# Patient Record
Sex: MALE | Race: WHITE
[De-identification: names, ages, dates, MRNs, and addresses within clinical notes are randomized per-mention and may not be internally consistent; named-entity substitution may affect disease eponyms.]

---

## 2018-03-14 ENCOUNTER — HOSPITAL ENCOUNTER (OUTPATIENT)
Dept: HOSPITAL 17 - HDIC | Age: 48
Discharge: HOME | End: 2018-03-14
Attending: INTERNAL MEDICINE
Payer: COMMERCIAL

## 2018-03-14 VITALS — WEIGHT: 215.83 LBS | BODY MASS INDEX: 31.97 KG/M2 | HEIGHT: 69 IN

## 2018-03-14 VITALS
HEART RATE: 61 BPM | DIASTOLIC BLOOD PRESSURE: 81 MMHG | SYSTOLIC BLOOD PRESSURE: 127 MMHG | OXYGEN SATURATION: 97 % | RESPIRATION RATE: 16 BRPM | TEMPERATURE: 98.4 F

## 2018-03-14 DIAGNOSIS — I25.10: Primary | ICD-10-CM

## 2018-03-14 DIAGNOSIS — Z79.899: ICD-10-CM

## 2018-03-14 LAB
BASOPHILS # BLD AUTO: 0 TH/MM3 (ref 0–0.2)
BASOPHILS NFR BLD: 0.7 % (ref 0–2)
BUN SERPL-MCNC: 16 MG/DL (ref 7–18)
CALCIUM SERPL-MCNC: 8.6 MG/DL (ref 8.5–10.1)
CHLORIDE SERPL-SCNC: 107 MEQ/L (ref 98–107)
CREAT SERPL-MCNC: 0.95 MG/DL (ref 0.6–1.3)
EOSINOPHIL # BLD: 0.1 TH/MM3 (ref 0–0.4)
EOSINOPHIL NFR BLD: 1.6 % (ref 0–4)
ERYTHROCYTE [DISTWIDTH] IN BLOOD BY AUTOMATED COUNT: 13 % (ref 11.6–17.2)
GFR SERPLBLD BASED ON 1.73 SQ M-ARVRAT: 84 ML/MIN (ref 89–?)
GLUCOSE SERPL-MCNC: 96 MG/DL (ref 74–106)
HCO3 BLD-SCNC: 24.5 MEQ/L (ref 21–32)
HCT VFR BLD CALC: 43 % (ref 39–51)
HGB BLD-MCNC: 15.1 GM/DL (ref 13–17)
INR PPP: 1 RATIO
LYMPHOCYTES # BLD AUTO: 1.6 TH/MM3 (ref 1–4.8)
LYMPHOCYTES NFR BLD AUTO: 23.7 % (ref 9–44)
MCH RBC QN AUTO: 32.1 PG (ref 27–34)
MCHC RBC AUTO-ENTMCNC: 35.1 % (ref 32–36)
MCV RBC AUTO: 91.4 FL (ref 80–100)
MONOCYTE #: 0.5 TH/MM3 (ref 0–0.9)
MONOCYTES NFR BLD: 7.4 % (ref 0–8)
NEUTROPHILS # BLD AUTO: 4.4 TH/MM3 (ref 1.8–7.7)
NEUTROPHILS NFR BLD AUTO: 66.6 % (ref 16–70)
PLATELET # BLD: 327 TH/MM3 (ref 150–450)
PMV BLD AUTO: 7.4 FL (ref 7–11)
PROTHROMBIN TIME: 10.3 SEC (ref 9.8–11.6)
RBC # BLD AUTO: 4.71 MIL/MM3 (ref 4.5–5.9)
SODIUM SERPL-SCNC: 141 MEQ/L (ref 136–145)
WBC # BLD AUTO: 6.6 TH/MM3 (ref 4–11)

## 2018-03-14 PROCEDURE — C1887 CATHETER, GUIDING: HCPCS

## 2018-03-14 PROCEDURE — 93005 ELECTROCARDIOGRAM TRACING: CPT

## 2018-03-14 PROCEDURE — 93458 L HRT ARTERY/VENTRICLE ANGIO: CPT

## 2018-03-14 PROCEDURE — 85610 PROTHROMBIN TIME: CPT

## 2018-03-14 PROCEDURE — 93571 IV DOP VEL&/PRESS C FLO 1ST: CPT

## 2018-03-14 PROCEDURE — 85025 COMPLETE CBC W/AUTO DIFF WBC: CPT

## 2018-03-14 PROCEDURE — 85730 THROMBOPLASTIN TIME PARTIAL: CPT

## 2018-03-14 PROCEDURE — 80048 BASIC METABOLIC PNL TOTAL CA: CPT

## 2018-03-14 PROCEDURE — 99153 MOD SED SAME PHYS/QHP EA: CPT

## 2018-03-14 PROCEDURE — C1769 GUIDE WIRE: HCPCS

## 2018-03-14 PROCEDURE — 99152 MOD SED SAME PHYS/QHP 5/>YRS: CPT

## 2018-03-14 PROCEDURE — C1893 INTRO/SHEATH, FIXED,NON-PEEL: HCPCS

## 2018-03-14 NOTE — CATHPROC
[facility] Report

Study Information

Study Number    Admission           Scheduled Start             Study Start

 

[id number]    Mar 14 2018 11:51AM      03/14/2018                Mar 14 2018 1:11PM

 

[facility]

 

Cardiac Catheterization

 

Admit Source                Facility Department

 

Other                   [facility] - Cath Lab

 

Physician and Clinical Staff

Initial Howard Villar         Circulator     Aleksandra Sahu,RN

 

                          Recorder      Florencia Aguero,RT(R)

 

                          Recorder      Siddhartha DUPONT, Lalita Paz,RT(R)

 

                          X-Ray        Felipa Tam ,RT(R)

 

Procedures Performed

Procedure                     Location (Site)            Vessel Name

 

Coronary Angiograms                 LCA                 Left Coronary

Coronary Angiograms                 RCA                 Right Coronary

L Heart Cath

Wire insertion                   Radial (left)             Radial Art.

Equipment

Time           Description           Size         Mfg Part Number  Used/Scraped

                   TRANSDUCER, TRUWAVE                  [id number]

13:14    [employer]                      *                    Used

                   W/Dunwello                      *[id number]

                                              [id number]



                                              *[id number]

                                              [id number]

13:14    [employer]    PACK, CCL CUSTOM        *                    Used

                                              *[id number]

13:14    [employer]    SUPPORT, ARTERIAL ADULT                [id number] *[id number] Used

                                              [id number]

13:50    [employer]         JL 4.0 DXTERITY CATHETER    FR 5                  Used

                                              *[id number]

                   PIG  DXTERITY

14:16    [employer]                         FR 5         [id number]    Used

                   CATHETER

                                              [id number]

13:58    [employer]/AVE       EBU 3.5 Z2 GUIDE CATHETER    FR 6                  Used

                                              *[id number]

                   BAND, RADIAL COMPRESSION TR              [id number]

14:22    [employer]                       24CM                  Used

                   SHORT 24                       *[id number]

13:58    [employer]       PACK, ANGIOPLASTY        *           [id number]      Used

                                              [id number]

13:14    [employer]       WIRE, EXCHANGE 260CM 3MMJ    260CM                  Used

                                              *[id number]

                                              [id number]

13:14    [employer]           MANIFOLD, 4 PORT        *                    Used

                                              *[id number]

13:14    [employer]          OMNIPAQUE, 350 MG, 150ML    150ML         [id number]      Used

                                              [id number]

13:14    [employer]       BLANKET,WARM AIR CCL      *                    Used

                                              *[id number]

                   SHEATH, FR6 TRANSRADIAL                [id number]

13:14    [employer]                      FR 6                  Used

                   SLENDER 10CM                     *[id number]

13:58    [employer]          PRIME WIRE, VERRATA 185CM    185CM         [id number] *[id number] Used

 

Equipment Model, Serial, Lot Number and Expiration Data

Description              Model Number      Serial Number      Lot Number        Expiration Date

 

JL 4.0 DXTERITY CATHETER                               [id number]         09-

 

PACK, ANGIOPLASTY                                  [id number]         09-

 

PIG  DXTERITY CATHETER                            [id number]         08-

 

PRIME WIRE, JOSHRATA 185CM                  [id number]                 01-

 

 

History: Allergies

Allergy              Reaction

 

Ibuprofen

 

 

History: Risk Factors

                     Family History of

Hypertension    Dyslipidemia               Previous MI   Previous Heart Failure

                     Premature CAD

Yes         Yes         Yes         No        No

Prior Valve

          Prior PCI      Prior CABG

Surgery

No         No          No

          Cerebrovascular   Peripheral Artery  Chronic Lung

On Dialysis                                  Diabetes

          Disease       Disease       Disease

No         No          No         No        No

History: Stress Tests

Stress or Imaging Studies Performed

 

No

 

 

History: Other

Current Smoker

 

No

 

Labs

Hgb (g/dl)     Hct (%)        WBC (l/cumm)      Platelets (thousands)

 

11.60-17.00     35.00-51.00      4.00-11.00       150..00

 

15.1        43           6.6          327

 

Glucose (mg/dl)   BUN (mg/dl)      Creatinine (mg/dl)  BUN:Creatinine (1:x)

74..00    7.00-18.00       0.50-1.30      10.00-20.00

 

96         16           0.9         17.8

 

Na (meq/l)     K (meq/l)

 

136..00    3.50-5.10

 

141         3.6

 

INR (PTT:PT)

 

0.90-1.10

 

1

 

CPK-MB (ng/ML)

 

0.50-3.60

 

Not Drawn

 

 

 

 

Medication

Medication Total Dose (Bolus/Oral)

Medication            Total Dosage/Unit

 

1% XYLOCAINE              3 mL

 

FENTANYL               25 mcg

 

HEPARIN              5800 units

 

RADIAL COCKTAIL            5 mL (Bolus)

 

VERSED                 1 mg

Medications (Bolus/Oral)

Medication           Time Given          Dosage/Unit      Administered By      Reason

 

VERSED             3/14/2018 1:36:18 PM     0.5 mg        Aleksandra Sahu

0.5 mg VERSED given by Aleksandra Sahu, RN via Peripheral IV. Ordered by Howard Mata

 

FENTANYL            3/14/2018 1:36:21 PM     25 mcg        Aleksandra Sahu

25 mcg FENTANYL given in lab by Aleksandra Sahu, RN via Peripheral IV. Ordered by Howard Mata
.

 

1% XYLOCAINE          3/14/2018 1:37:02 PM      3 mL        Howard Mata

3 mL 1% XYLOCAINE given in lab by Howard Mata in Left Wrist via Subcutaneous. Ordered by Howard Arnold

 

                                                      Ntg 200mcg Verapamil 2.5mg Heparin

RADIAL COCKTAIL        3/14/2018 1:38:50 PM      5 mL (Bolus)    Howard Mata

                                                      3000U

5 mL (Bolus) RADIAL COCKTAIL given in lab by Howard Mata via Radial. Using [Solution Name]. O
rdered by Howard Mata Reason:

Ntg 200mcg Verapamil 2.5mg Heparin 4000U.

VERSED             3/14/2018 1:40:54 PM     0.5 mg        Aleksandra Sahu

0.5 mg VERSED given by Aleksandra Sahu, RN via Peripheral IV. Ordered by Howard Mata

 

HEPARIN            3/14/2018 1:57:49 PM    5800 units       Aleksandra Sahu

5800 units HEPARIN given in lab by Aleksandra Sahu, RN via Peripheral IV. Ordered by Blane Mata

 

 

Initial Case Assessment

Initial Case Assessment

Cardiovascular

HR             NIBP

 

84             134/84

 

Edema Present       Skin color              Skin

 

None            Normal                Warm Dry

 

Circulatory - Right Pulses

Dorsalis Pedis       Femoral           Radial

 

3             3              3

 

Scale (0,1,2,3,4,d)

 

Circulatory - Left Pulses

Dorsalis Pedis       Femoral           Radial

 

2             1

 

Scale (0,1,2,3,4,d)

 

Neurological State

              Oriented to time-place-

Alert                       Moves all extremities

              person

 

Respiration - General

SpO2 (%)

 

96

 

Chronological Log

Time     Study Chronological Log

 

13:07:47   Patient arrived via Bed.

 

13:10:52   Patient Name, D.O.B, / Armband Verified By R.N.

13:10:52  Consent signed by the physician and the patient and verified by the Cath Lab staff.

 

13:10:53  Pre-op and post- op instructions given; patient acknowledges understanding of instructions.


 

13:10:54  Verbal Stimulation=2 Physical Stimulation=2 Airway=2 Respiration=2 TOTAL=8. (0=absent, 1=li
mited, 2=present)

      Allens test performed on the left radial and ulnar artery by MARIO with a positive result

13:10:55

 

13:11:01  Patient has been NPO for More than 6Hrs.

 

13:11:02  Skin Breakdown-none

 

13:11:04  A # 20 IV was noted in the Antecubital (left). Grade = 0

 

13:11:08  History and physical on the chart or being dictated.

 

13:11:09  Assessment: Initial Case

      Vitals capture started with the following parameters, Patient=Adult, Interval=5 min, Initial Pr
mqzmba=491 mmHg,

13:15:09

      Deflation Rate=5 mmHg, Cuff placed on Left Leg

13:15:49  HR=66 bpm, HQXB=233/84 mmhg, SpO2=94.0 %, Resp=15 B/min, Pain=0, Marcel=10, Lopez=2

 

13:18:41  Allens test performed on the right radial and ulnar artery.

 

13:19:06  Skin Breakdown- none reported by patient

 

13:19:37  Whitney Prominences Protected

      Assessment: Initial Case, HR=84 BPM, KDQZ=955/84 mmhg, Edema=None, Color=Normal, Skin = Warm, D
ry

      Right Pulses: Piero Ped=3, Femoral=3, Radial=3

13:19:46  Left Pulses: Piero Ped=2, Femoral=1

      Neurological: State=Alert, Ox3, MEEHAN

      Respiration: SpO2=96 %

13:20:46  HR=62 bpm, MFKX=605/79 mmhg, SpO2=98.0 %, Resp=11 B/min, Pain=0, Lopez=2

 

13:21:03  Left radial and groin(s) prepped with 2% chlorhexidine, and draped after a 3 min. waiting t
taqueria.

 

13:24:18  MD arrived.

 

13:25:45  HR=60 bpm, IPGV=138/82 mmhg, SpO2=98.0 %, Resp=12 B/min, Pain=0, Lopez=2

 

13:26:29  Pressure channel 1 zeroed.

 

13:26:51  Pressure channel 1 zeroed.

 

13:30:44  HR=58 bpm, DZJS=301/83 mmhg, SpO2=97.0 %, Resp=7 B/min, Pain=0, Lopez=2

      Time Out. Correct patient, correct procedure, correct physician, power injector loaded, or not 
loaded with contrast with

13:34:05

      surgical team present. Time Out Concurred by MD and individual staff in procedure.

13:34:25  Case Start

 

13:35:43  HR=60 bpm, KIED=223/86 mmhg, Resp=14 B/min, Pain=0, Lopez=2

 

13:36:18  0.5 mg VERSED given by Aleksandra Sahu, RN via Peripheral IV. Ordered by Howard Mata

 

13:36:21  25 mcg FENTANYL given in lab by Aleksandra Sahu, RN via Peripheral IV. Ordered by Howard Mata

      3 mL 1% XYLOCAINE given in lab by Howard Mata in Left Wrist via Subcutaneous. Ordered b
y Howard Mata

13:37:02

      G.

13:38:07  Access site was Left Radial Artery.

      A SHEATH, FR6 TRANSRADIAL SLENDER 10CM FR 6 was advanced into the Radial (left) using the Modif
ied Seldinger

13:38:26

      technique.

      5 mL (Bolus) RADIAL COCKTAIL given in lab by Howard Mata via Radial. Using [Solution Na
me]. Ordered by

13:38:50

      Howard Mata Reason: Ntg 200mcg Verapamil 2.5mg Heparin 4000U.

13:40:08  A JR 4.0 INFINITI CATHETER FR 5 was advanced over a wire.

 

13:40:54  0.5 mg VERSED given by Aleksandra Sahu, RN via Peripheral IV. Ordered by Howard Mata

 

13:41:14  HR=67 bpm, WXGK=402/77 mmhg, SpO2=97.0 %, Resp=14 B/min, Pain=0, Lopez=2

 

13:41:43  Wire removed

 

13:44:05  Pressure channel 1 zeroed.

13:44:17  NIBP STAT measurement started.

 

13:44:56  HR=62 bpm, NIBP=98/56 mmhg, Resp=17 B/min, Pain=0, Lopez=2

 

13:45:55  HR=63 bpm, NIBP=99/46 mmhg, SpO2=94.0 %, Resp=29 B/min, Pain=0, Lopez=2

 

13:46:30  The  RCA was injected and visualized at various angles. OMNIPAQUE, 350 MG, 150ML 150ML used
.

 

13:47:21  NIBP STAT measurement started.

 

13:47:54  HR=60 bpm, NIBP=99/46 mmhg, SpO2=95.0 %, Resp=19 B/min, Pain=0, Lopez=2

      After removing the current catheter a JL 4.0 DXTERITY CATHETER FR 5 was advanced over a WIRE, E
XCHANGE 260CM

13:49:58

      3MMJ 260CM.

13:50:40  HR=65 bpm, KLYJ=530/69 mmhg, SpO2=96.0 %, Resp=17 B/min, Pain=0, Lopez=2

 

13:51:17  The  LCA was injected and visualized at various angles. OMNIPAQUE, 350 MG, 150ML 150ML used
.

 

13:55:43  HR=58 bpm, VZRW=927/74 mmhg, Resp=14 B/min, Pain=0, Lopez=2

      After removing the current catheter a EBU 3.5 Z2 GUIDE CATHETER FR 6 was advanced over a WIRE, 
EXCHANGE

13:57:03

      260CM 3MMJ 260CM.

13:57:49  5800 units HEPARIN given in lab by Aleksandra Sahu RN via Peripheral IV. Ordered by Howard Patricio

 

14:00:46  HR=59 bpm, MLMN=548/72 mmhg, SpO2=94 %, Resp=12 B/min, Pain=0, Lopez=2

 

14:05:45  HR=58 bpm, JQLK=058/72 mmhg, SpO2=94.0 %, Resp=18 B/min, Pain=0, Lopez=2

 

14:05:54  A PRIME WIRE, VERRATA 185CM 185CM was inserted via Radial (left).

 

14:10:46  HR=59 bpm, KSIO=699/66 mmhg, SpO2=94.0 %, Resp=17 B/min, Pain=0, Lopez=2

 

14:11:01  Flow Wire was was placed in the OM1 Mid.. The IFR measures 0.98 Percent.

 

14:12:36  The PRIME WIRE, VERRATA 185CM 185CM was removed.

      After removing the current catheter a PIG  DXTERITY CATHETER FR 5 was advanced over a WI
RE, EXCHANGE

14:14:04

      260CM 3MMJ 260CM.

14:15:47  HR=60 bpm, FTSO=441/67 mmhg, SpO2=97.0 %, Resp=18 B/min, Pain=0, Lopez=2

      Recorded Pressure: LV, HR=58, Condition=Condition 1

14:19:27

      (Left Ventricle) /-1/16

      Recorded Pressure: LV, Ao, HR=56, Condition=Condition 1

14:19:41  (Left Ventricle) /-1/17,

      (Aorta) Ao 108/63/85

14:20:42  HR=66 bpm, SKZM=362/75 mmhg, SpO2=97.0 %, Resp=22 B/min, Pain=0, Lopez=2

 

14:20:50  Case End

      Radial Compression Device Used. 9 mLs of air placed in BAND, RADIAL COMPRESSION TR SHORT 24 24C
M. Affected

14:21:22

      hand 94 % O2 saturation.

14:24:48  Vitals capture stopped.

 

14:26:24  Bedside Report will be given.

 

14:27:20  DOCU called. Spoke to Rukhsana

 

14:27:51  A Left Heart Cath was performed.

 

14:30:30  Patient moved to stretcher

End Study - Contrast Media Used In Study

Contrast      Total Opened (mL)  Total Used (mL)     Total Wasted (mL)

 

Omnipaque     70         70            0

 

End Study - Maximum Contrast Load

Max Contrast Load (mL)

 

543.9

 

End Study - Radiation Exposure

Fluoro Time

(minutes)

8.9

 

 

End Study - Patient Disposition

Complications   Transferred To       Interventional Outcome

 

No         Cath Lab Holding      No attempt made

## 2018-03-15 NOTE — EKG
Date Performed: 03/14/2018       Time Performed: 12:50:34

 

PTAGE:      47 years

 

EKG:      Sinus rhythm 

 

. Normal ECG 

 

NO PREVIOUS TRACING            

 

DOCTOR:   Mariel Varela  Interpretating Date/Time  03/15/2018 13:52:09

## 2018-03-20 NOTE — MA
cc:

MataHoward dominguez VICTOR HUGO FERNANDES

****

 

 

03/14/2018

 

DATE OF PROCEDURE:

03/14/2018

 

PROCEDURE PERFORMED:

Left heart catheterization, coronary angiogram, moderate sedation 45 

minutes, IFR left circumflex.

 

PREPROCEDURE DIAGNOSIS:

Chest pain, abnormal CTA.

 

POSTPROCEDURE DIAGNOSIS:

Moderate coronary artery disease.

 

MEDICATIONS:

Versed 1 mg, fentanyl 25 mcg, Heparin 9800 units, nitro 200 mcg, 

verapamil 2.5 mg.

 

CONTRAST USED:

70 mL.

 

FLUOROSCOPY:

8.9 minutes.

 

MODERATE SEDATION:

45 minutes.

 

ESTIMATED BLOOD LOSS:

10 mL.

 

PROCEDURAL SUMMARY:

Joseph Rogers is a pleasant 47-year-old male who sees my partner, Dr. Varela in the office and presented with chest pain.  He underwent a 

stress test which showed no ischemic lesions, but continued to have 

chest pain and so he was recommended cardiac catheterization.  The 

patient was hesitant about this and so he underwent a CTA, which 

showed greater than 50% disease in the LAD, circumflex, and RCA and so

at that time, he was recommended cardiac catheterization.  Risks, 

benefits and alternatives were explained to him and consented as such.

 

He was brought to the lab and prepped in the usual sterile fashion.  

The right radial artery was accessed using a modified Seldinger 

technique and placement of a 5/6 Maori slender sheath.  This was 

easily aspirated and flushed.  A JR4 was advanced over a J-wire to the

ascending aorta and across the aortic valve for measurement of left 

ventricular pressure.  This was pulled back across the aortic valve 

showing no significant gradient of aortic stenosis.  JR4 was exchanged

out for a JL3.5, which was used to perform selective angiography of 

the left coronary artery system.  As there was moderate disease noted 

in the left circumflex, I felt that this should be further 

investigated.  An EBU 3.5 guide was engaged into the left knee.  The 

patient was given heparin as an anticoagulant.  A Verrata wire was 

advanced into the distal obtuse marginal.  IFR was measured at 0.98, 

showing no physiologic stenosis.  The wire was removed and final 

angiogram shows no disruption of the coronary anatomy.  EBU guide was 

removed over a J-wire.  Radial band was placed over the arteriotomy 

site for hemostasis.  The patient left the cath lab cardiovascularly 

stable.

 

FINDINGS:

LEFT MAIN:

Long vessel, but overall normal.  It bifurcates into an LAD and 

circumflex.

 

LAD:

Normal size vessel with 20% disease throughout the proximal to mid 

portion.  Distally it has no significant disease.  It gives off 1 

major diagonal with no significant disease.

 

RAMUS:

Overall small vessel at around 1 mm.  Appears to have diffuse 50% 

disease.

 

LEFT CIRCUMFLEX:

Normal size vessel with 40% disease in the mid portion.  It gives off 

1 obtuse marginal with no significant disease.

 

RCA:

Normal size vessel with a 30% lesion in the proximal portion.  

Distally, it supplies the PDA with multiple PLBs and no significant 

disease.

 

LVEDP:

17.

 

IMPRESSION:

1.  Atypical chest pain.

2.  Abnormal CTA.

3.  Mild to moderate coronary artery disease, as above.

 

RECOMMENDATIONS:

1.  Mr. Rogers appears to have mild to moderate coronary artery disease 

with no significant lesions causing ischemia at this time.

2.  He will be recommended continued medical therapy.

3.  We will followup with Dr. Varela as previously scheduled.

 

Thank you for allowing me to see Joseph Rogers.  If there are any 

questions, please do not hesitate to call.

 

 

__________________________________

Howard Mata DO

 

 

VGP/FESTUS

D: 03/20/2018, 12:24 AM

T: 03/20/2018, 08:29 AM

Visit #: [id number]

Job #: [id number]